# Patient Record
Sex: FEMALE | URBAN - METROPOLITAN AREA
[De-identification: names, ages, dates, MRNs, and addresses within clinical notes are randomized per-mention and may not be internally consistent; named-entity substitution may affect disease eponyms.]

---

## 2019-11-11 ENCOUNTER — TELEPHONE (OUTPATIENT)
Dept: FAMILY MEDICINE | Facility: CLINIC | Age: 79
End: 2019-11-11

## 2019-11-11 NOTE — TELEPHONE ENCOUNTER
Reason for Call:  INR    Who is calling?  [facility]ursing [facility]: [facility]     Phone number:  [phone]    Fax number:  NA    Name of caller: Aida     INR Value:  2.01    Are there any other concerns:  No, but waiting for phone call back for orders     Can we leave a detailed message on this number? YES    Phone number patient can be reached at: Other phone number: [phone]      Call taken on 11/11/2019 at 3:57 PM by Eva Peralta

## 2019-11-11 NOTE — TELEPHONE ENCOUNTER
A user error has taken place: charting done on wrong patient and has been corrected.    Mago Alegre, RN- [facility] RN